# Patient Record
Sex: FEMALE | RURAL
[De-identification: names, ages, dates, MRNs, and addresses within clinical notes are randomized per-mention and may not be internally consistent; named-entity substitution may affect disease eponyms.]

---

## 2023-09-18 ENCOUNTER — TELEPHONE (OUTPATIENT)
Dept: CARDIOLOGY | Facility: CLINIC | Age: 48
End: 2023-09-18
Payer: COMMERCIAL

## 2023-09-18 NOTE — TELEPHONE ENCOUNTER
LVM FOR PT TO CALL US TO SCHEDULE NEW SLEEP REFERRAL APPT. 2ND ATTEMPT. HUB OK TO READ AND SCHEDULE NEW SLEEP APPT IN OUR [place] OFFICE WITH ANY OF OUR PROVIDERS. 1ST AVAILABLE APPT IS OK.